# Patient Record
Sex: FEMALE | Race: WHITE | NOT HISPANIC OR LATINO
[De-identification: names, ages, dates, MRNs, and addresses within clinical notes are randomized per-mention and may not be internally consistent; named-entity substitution may affect disease eponyms.]

---

## 2017-09-19 ENCOUNTER — RX RENEWAL (OUTPATIENT)
Age: 52
End: 2017-09-19

## 2017-10-04 ENCOUNTER — RX RENEWAL (OUTPATIENT)
Age: 52
End: 2017-10-04

## 2017-11-25 ENCOUNTER — RX RENEWAL (OUTPATIENT)
Age: 52
End: 2017-11-25

## 2017-12-26 ENCOUNTER — RX RENEWAL (OUTPATIENT)
Age: 52
End: 2017-12-26

## 2018-01-04 ENCOUNTER — RX RENEWAL (OUTPATIENT)
Age: 53
End: 2018-01-04

## 2018-02-21 ENCOUNTER — RX RENEWAL (OUTPATIENT)
Age: 53
End: 2018-02-21

## 2018-02-22 ENCOUNTER — RX RENEWAL (OUTPATIENT)
Age: 53
End: 2018-02-22

## 2018-03-06 ENCOUNTER — APPOINTMENT (OUTPATIENT)
Dept: INTERNAL MEDICINE | Facility: CLINIC | Age: 53
End: 2018-03-06
Payer: COMMERCIAL

## 2018-03-06 ENCOUNTER — NON-APPOINTMENT (OUTPATIENT)
Age: 53
End: 2018-03-06

## 2018-03-06 VITALS
WEIGHT: 220 LBS | OXYGEN SATURATION: 99 % | DIASTOLIC BLOOD PRESSURE: 84 MMHG | SYSTOLIC BLOOD PRESSURE: 150 MMHG | HEART RATE: 108 BPM | HEIGHT: 63 IN | BODY MASS INDEX: 38.98 KG/M2 | TEMPERATURE: 98.9 F

## 2018-03-06 VITALS — DIASTOLIC BLOOD PRESSURE: 86 MMHG | SYSTOLIC BLOOD PRESSURE: 132 MMHG

## 2018-03-06 DIAGNOSIS — Z82.69 FAMILY HISTORY OF OTHER DISEASES OF THE MUSCULOSKELETAL SYSTEM AND CONNECTIVE TISSUE: ICD-10-CM

## 2018-03-06 DIAGNOSIS — Z80.8 FAMILY HISTORY OF MALIGNANT NEOPLASM OF OTHER ORGANS OR SYSTEMS: ICD-10-CM

## 2018-03-06 DIAGNOSIS — Z80.42 FAMILY HISTORY OF MALIGNANT NEOPLASM OF PROSTATE: ICD-10-CM

## 2018-03-06 DIAGNOSIS — Z82.0 FAMILY HISTORY OF EPILEPSY AND OTHER DISEASES OF THE NERVOUS SYSTEM: ICD-10-CM

## 2018-03-06 DIAGNOSIS — Z81.8 FAMILY HISTORY OF OTHER MENTAL AND BEHAVIORAL DISORDERS: ICD-10-CM

## 2018-03-06 DIAGNOSIS — Z78.9 OTHER SPECIFIED HEALTH STATUS: ICD-10-CM

## 2018-03-06 DIAGNOSIS — Z82.3 FAMILY HISTORY OF STROKE: ICD-10-CM

## 2018-03-06 DIAGNOSIS — Z82.49 FAMILY HISTORY OF ISCHEMIC HEART DISEASE AND OTHER DISEASES OF THE CIRCULATORY SYSTEM: ICD-10-CM

## 2018-03-06 LAB
BILIRUB UR QL STRIP: NORMAL
CLARITY UR: CLEAR
COLLECTION METHOD: NORMAL
GLUCOSE UR-MCNC: NORMAL
HCG UR QL: 0.2 EU/DL
HGB UR QL STRIP.AUTO: NORMAL
KETONES UR-MCNC: NORMAL
LEUKOCYTE ESTERASE UR QL STRIP: NORMAL
NITRITE UR QL STRIP: NORMAL
PH UR STRIP: 7
PROT UR STRIP-MCNC: NORMAL
SP GR UR STRIP: 1.01

## 2018-03-06 PROCEDURE — 93000 ELECTROCARDIOGRAM COMPLETE: CPT

## 2018-03-06 PROCEDURE — 99396 PREV VISIT EST AGE 40-64: CPT | Mod: 25

## 2018-03-06 PROCEDURE — 81003 URINALYSIS AUTO W/O SCOPE: CPT | Mod: QW

## 2018-03-23 ENCOUNTER — RX RENEWAL (OUTPATIENT)
Age: 53
End: 2018-03-23

## 2018-03-26 ENCOUNTER — RX RENEWAL (OUTPATIENT)
Age: 53
End: 2018-03-26

## 2018-06-27 ENCOUNTER — RX RENEWAL (OUTPATIENT)
Age: 53
End: 2018-06-27

## 2018-09-21 ENCOUNTER — RX RENEWAL (OUTPATIENT)
Age: 53
End: 2018-09-21

## 2019-01-07 ENCOUNTER — RX RENEWAL (OUTPATIENT)
Age: 54
End: 2019-01-07

## 2019-02-11 ENCOUNTER — RX RENEWAL (OUTPATIENT)
Age: 54
End: 2019-02-11

## 2019-03-13 DIAGNOSIS — Z87.42 PERSONAL HISTORY OF OTHER DISEASES OF THE FEMALE GENITAL TRACT: ICD-10-CM

## 2019-03-22 ENCOUNTER — LABORATORY RESULT (OUTPATIENT)
Age: 54
End: 2019-03-22

## 2019-03-26 ENCOUNTER — NON-APPOINTMENT (OUTPATIENT)
Age: 54
End: 2019-03-26

## 2019-03-26 ENCOUNTER — APPOINTMENT (OUTPATIENT)
Dept: INTERNAL MEDICINE | Facility: CLINIC | Age: 54
End: 2019-03-26
Payer: COMMERCIAL

## 2019-03-26 VITALS
TEMPERATURE: 98 F | OXYGEN SATURATION: 98 % | DIASTOLIC BLOOD PRESSURE: 70 MMHG | WEIGHT: 221 LBS | BODY MASS INDEX: 39.16 KG/M2 | HEART RATE: 110 BPM | HEIGHT: 63 IN | SYSTOLIC BLOOD PRESSURE: 130 MMHG

## 2019-03-26 PROCEDURE — 99396 PREV VISIT EST AGE 40-64: CPT | Mod: 25

## 2019-03-26 PROCEDURE — 93000 ELECTROCARDIOGRAM COMPLETE: CPT

## 2019-03-26 RX ORDER — CHLORDIAZEPOXIDE HYDROCHLORIDE AND CLIDINIUM BROMIDE 5; 2.5 MG/1; MG/1
5-2.5 CAPSULE, GELATIN COATED ORAL
Qty: 30 | Refills: 0 | Status: COMPLETED | COMMUNITY
Start: 2018-03-07 | End: 2019-03-26

## 2019-03-26 RX ORDER — SIMVASTATIN 20 MG/1
20 TABLET, FILM COATED ORAL
Qty: 90 | Refills: 0 | Status: COMPLETED | COMMUNITY
Start: 2017-09-19 | End: 2019-03-26

## 2019-03-26 RX ORDER — BUTALBITAL, ASPIRIN, AND CAFFEINE 325; 50; 40 MG/1; MG/1; MG/1
50-325-40 CAPSULE ORAL
Qty: 30 | Refills: 0 | Status: COMPLETED | COMMUNITY
Start: 2018-03-07 | End: 2019-03-26

## 2019-03-26 NOTE — HISTORY OF PRESENT ILLNESS
[FreeTextEntry1] : The patient is a 53 year year old female who presents for annual physical examination.\par  [de-identified] : The patient is a 53-year-old female with past medical history significant for asthma, hypertension, hyperlipidemia, obesity, iron deficiency anemia presents for routine physical in with numerous complaints. Patient reports that several months ago her pulmonologist found a lesion on her left tonsil which he believes to be a polyp and she was advised to see an otolaryngologist which she has not yet done. She was also told that she has iron deficiency anemia which she also has not yet followed up with. Patient reports that she has had erratic menstrual cycles over the past year. In January and February she had only spotting and has not had significant menstrual bleeding. She has not been taking her iron supplements because she finds them to be difficult to tolerate. The patient also reports that she has been noncompliant with her lipid-lowering medication as she ran out several months ago and never got a renewal. She admits to feeling fatigued which she attributes to her history of anemia. She does not exercise on a regular basis. Patient denies any symptoms of chest pain, palpitations, dizziness, lightheadedness, or syncope. She has had chronic, intermittent asthma for which she sees a pulmonologist on a regular basis. Her appetite remains good and she denies any symptoms of nausea, vomiting, bright red blood per rectum, or melena. She continues to suffer with symptoms of irritable bowel which requires occasional Librax.

## 2019-03-26 NOTE — HEALTH RISK ASSESSMENT
[Good] : ~his/her~  mood as  good [With Family] : lives with family [# of Members in Household ___] :  household currently consist of [unfilled] member(s) [On disability] : on disability [Graduate School] : graduate school [Single] : single [Feels Safe at Home] : Feels safe at home [Fully functional (bathing, dressing, toileting, transferring, walking, feeding)] : Fully functional (bathing, dressing, toileting, transferring, walking, feeding) [Fully functional (using the telephone, shopping, preparing meals, housekeeping, doing laundry, using] : Fully functional and needs no help or supervision to perform IADLs (using the telephone, shopping, preparing meals, housekeeping, doing laundry, using transportation, managing medications and managing finances) [Reports changes in vision] : Reports changes in vision [Smoke Detector] : smoke detector [Carbon Monoxide Detector] : carbon monoxide detector [Seat Belt] :  uses seat belt [Sunscreen] : uses sunscreen [Designated Healthcare Proxy] : Designated healthcare proxy [Name: ___] : Health Care Proxy's Name: [unfilled]  [Relationship: ___] : Relationship: [unfilled] [No falls in past year] : Patient reported no falls in the past year [0] : 2) Feeling down, depressed, or hopeless: Not at all (0) [] : No [Change in mental status noted] : No change in mental status noted [Language] : denies difficulty with language [Behavior] : denies difficulty with behavior [Handling Complex Tasks] : denies difficulty handling complex tasks [Reasoning] : denies difficulty with reasoning [Reports changes in hearing] : Reports no changes in hearing [Reports changes in dental health] : Reports no changes in dental health [MammogramDate] : several years [PapSmearDate] : 2017 [ColonoscopyDate] : 10/16

## 2019-03-26 NOTE — PHYSICAL EXAM
[No Acute Distress] : no acute distress [Well Nourished] : well nourished [Well Developed] : well developed [Well-Appearing] : well-appearing [Normal Sclera/Conjunctiva] : normal sclera/conjunctiva [PERRL] : pupils equal round and reactive to light [EOMI] : extraocular movements intact [Normal Outer Ear/Nose] : the outer ears and nose were normal in appearance [Normal TMs] : both tympanic membranes were normal [No JVD] : no jugular venous distention [Supple] : supple [No Lymphadenopathy] : no lymphadenopathy [Thyroid Normal, No Nodules] : the thyroid was normal and there were no nodules present [No Respiratory Distress] : no respiratory distress  [Clear to Auscultation] : lungs were clear to auscultation bilaterally [No Accessory Muscle Use] : no accessory muscle use [Normal Rate] : normal rate  [Regular Rhythm] : with a regular rhythm [Normal S1, S2] : normal S1 and S2 [No Murmur] : no murmur heard [No Carotid Bruits] : no carotid bruits [No Abdominal Bruit] : a ~M bruit was not heard ~T in the abdomen [No Varicosities] : no varicosities [Pedal Pulses Present] : the pedal pulses are present [No Edema] : there was no peripheral edema [No Extremity Clubbing/Cyanosis] : no extremity clubbing/cyanosis [No Palpable Aorta] : no palpable aorta [Normal Appearance] : normal in appearance [No Nipple Discharge] : no nipple discharge [No Axillary Lymphadenopathy] : no axillary lymphadenopathy [Soft] : abdomen soft [Non Tender] : non-tender [Non-distended] : non-distended [No Masses] : no abdominal mass palpated [No HSM] : no HSM [Normal Bowel Sounds] : normal bowel sounds [Normal Sphincter Tone] : normal sphincter tone [No Mass] : no mass [Normal Posterior Cervical Nodes] : no posterior cervical lymphadenopathy [Normal Anterior Cervical Nodes] : no anterior cervical lymphadenopathy [No Joint Swelling] : no joint swelling [Grossly Normal Strength/Tone] : grossly normal strength/tone [No Rash] : no rash [Normal Gait] : normal gait [Coordination Grossly Intact] : coordination grossly intact [No Focal Deficits] : no focal deficits [Normal Affect] : the affect was normal [Alert and Oriented x3] : oriented to person, place, and time [Normal Insight/Judgement] : insight and judgment were intact [Stool Occult Blood] : stool negative for occult blood [de-identified] : white polypoid lesion on left tonsil [de-identified] : obese

## 2019-03-26 NOTE — DATA REVIEWED
[FreeTextEntry1] : EKG: Normal sinus rhythm at 97 beats per minute, left atrial enlargement, no acute ST-T wave changes.

## 2019-03-27 ENCOUNTER — RX RENEWAL (OUTPATIENT)
Age: 54
End: 2019-03-27

## 2019-04-23 ENCOUNTER — APPOINTMENT (OUTPATIENT)
Dept: GASTROENTEROLOGY | Facility: CLINIC | Age: 54
End: 2019-04-23

## 2019-04-29 ENCOUNTER — RX RENEWAL (OUTPATIENT)
Age: 54
End: 2019-04-29

## 2019-05-09 ENCOUNTER — RX RENEWAL (OUTPATIENT)
Age: 54
End: 2019-05-09

## 2019-06-03 ENCOUNTER — RX RENEWAL (OUTPATIENT)
Age: 54
End: 2019-06-03

## 2019-08-05 ENCOUNTER — RX RENEWAL (OUTPATIENT)
Age: 54
End: 2019-08-05

## 2019-09-04 ENCOUNTER — RX RENEWAL (OUTPATIENT)
Age: 54
End: 2019-09-04

## 2019-09-27 ENCOUNTER — RX RENEWAL (OUTPATIENT)
Age: 54
End: 2019-09-27

## 2019-11-08 ENCOUNTER — RX RENEWAL (OUTPATIENT)
Age: 54
End: 2019-11-08

## 2019-11-18 ENCOUNTER — RX RENEWAL (OUTPATIENT)
Age: 54
End: 2019-11-18

## 2019-12-03 ENCOUNTER — RX RENEWAL (OUTPATIENT)
Age: 54
End: 2019-12-03

## 2019-12-05 ENCOUNTER — RX RENEWAL (OUTPATIENT)
Age: 54
End: 2019-12-05

## 2019-12-23 ENCOUNTER — RX RENEWAL (OUTPATIENT)
Age: 54
End: 2019-12-23

## 2019-12-26 ENCOUNTER — APPOINTMENT (OUTPATIENT)
Dept: INTERNAL MEDICINE | Facility: CLINIC | Age: 54
End: 2019-12-26
Payer: COMMERCIAL

## 2019-12-26 VITALS
WEIGHT: 229 LBS | SYSTOLIC BLOOD PRESSURE: 130 MMHG | TEMPERATURE: 98.5 F | OXYGEN SATURATION: 98 % | BODY MASS INDEX: 40.57 KG/M2 | HEIGHT: 63 IN | DIASTOLIC BLOOD PRESSURE: 72 MMHG | HEART RATE: 104 BPM

## 2019-12-26 PROCEDURE — 99215 OFFICE O/P EST HI 40 MIN: CPT

## 2019-12-26 RX ORDER — DOXYCYCLINE HYCLATE 100 MG/1
100 CAPSULE ORAL
Qty: 14 | Refills: 0 | Status: COMPLETED | COMMUNITY
Start: 2019-12-18

## 2019-12-26 RX ORDER — MEDROXYPROGESTERONE ACETATE 10 MG/1
10 TABLET ORAL
Qty: 10 | Refills: 0 | Status: COMPLETED | COMMUNITY
Start: 2019-11-19

## 2019-12-26 NOTE — HISTORY OF PRESENT ILLNESS
[No Pertinent Cardiac History] : no history of aortic stenosis, atrial fibrillation, coronary artery disease, recent myocardial infarction, or implantable device/pacemaker [Asthma] : asthma [(Patient denies any chest pain, claudication, dyspnea on exertion, orthopnea, palpitations or syncope)] : Patient denies any chest pain, claudication, dyspnea on exertion, orthopnea, palpitations or syncope [COPD] : no COPD [Sleep Apnea] : no sleep apnea [Smoker] : not a smoker [Self] : no previous adverse anesthesia reaction [Family Member] : no family member with adverse anesthesia reaction/sudden death [Chronic Anticoagulation] : no chronic anticoagulation [Chronic Kidney Disease] : no chronic kidney disease [Diabetes] : no diabetes [FreeTextEntry1] : D&C hysteroscopy with possible laparoscopy [FreeTextEntry2] : 12/30/19 [FreeTextEntry3] : Dr. Wheeler [FreeTextEntry4] : The patient is a 54-year-old female with past medical history significant for hypertension, hypothyroidism, hyperlipidemia, migraine headaches, anemia, asthma, presents for preoperative clearance prior to D&C for abnormal vaginal bleeding.  Patient admits that she was recently seen by her allergist whom she sees for management of allergies and asthma along with her pulmonologist.  She was provided medical clearance from her allergist.  Patient was last seen by her allergist on November 11, 2019.  Patient reports that at that time pulmonary function tests were noted to be normal.  She admits to occasional episodes of shortness of breath for which she requires a rescue inhaler.  These episodes are unpredictable and triggered by numerous factors.  She denies recent exertional chest pain, palpitations, fever, chills, nausea, vomiting, or diarrhea.

## 2019-12-26 NOTE — REVIEW OF SYSTEMS
[Negative] : Heme/Lymph [Shortness Of Breath] : shortness of breath [Cough] : cough [Wheezing] : no wheezing

## 2019-12-26 NOTE — PHYSICAL EXAM
[Normal Outer Ear/Nose] : the outer ears and nose were normal in appearance [Normal Oropharynx] : the oropharynx was normal [Normal TMs] : both tympanic membranes were normal [No Edema] : there was no peripheral edema [Normal] : soft, non-tender, non-distended, no masses palpated, no HSM and normal bowel sounds [Normal Posterior Cervical Nodes] : no posterior cervical lymphadenopathy [Normal Anterior Cervical Nodes] : no anterior cervical lymphadenopathy [No Joint Swelling] : no joint swelling [No Rash] : no rash [Coordination Grossly Intact] : coordination grossly intact [Normal Affect] : the affect was normal [No Focal Deficits] : no focal deficits [Alert and Oriented x3] : oriented to person, place, and time [Normal Insight/Judgement] : insight and judgment were intact [de-identified] : Obese

## 2019-12-26 NOTE — ASSESSMENT
[Patient Optimized for Surgery] : Patient optimized for surgery [Other: _____] : [unfilled] [As per surgery] : as per surgery [FreeTextEntry4] : The patient is a 54-year-old female with past medical history significant for asthma, hypertension, hyperlipidemia, hypothyroidism, obesity presents for preoperative clearance prior to D&C.\par \par #1.  Asthma: Patient to obtain pulmonary clearance from Dr. Diomedes Hines.  Pulmonary function tests performed in November noted to be within normal limits.  Pulmonary examination today noted to be within normal limits.  No evidence of active wheezing or bronchospasm.\par \par #2.  Hypertension: Patient was advised to get amlodipine with small sip of water on the morning of surgery.\par \par #3.  Patient advised to avoid aspirin, nonsteroidal anti-inflammatories 1 week prior to surgery.\par \par #4.  DVT prophylaxis as per surgery.

## 2020-01-06 ENCOUNTER — RX RENEWAL (OUTPATIENT)
Age: 55
End: 2020-01-06

## 2020-03-26 ENCOUNTER — APPOINTMENT (OUTPATIENT)
Dept: INTERNAL MEDICINE | Facility: CLINIC | Age: 55
End: 2020-03-26

## 2020-04-06 ENCOUNTER — RX RENEWAL (OUTPATIENT)
Age: 55
End: 2020-04-06

## 2020-04-08 ENCOUNTER — RX RENEWAL (OUTPATIENT)
Age: 55
End: 2020-04-08

## 2020-05-25 ENCOUNTER — RX RENEWAL (OUTPATIENT)
Age: 55
End: 2020-05-25

## 2020-06-17 ENCOUNTER — RX RENEWAL (OUTPATIENT)
Age: 55
End: 2020-06-17

## 2020-06-18 ENCOUNTER — RX RENEWAL (OUTPATIENT)
Age: 55
End: 2020-06-18

## 2020-09-17 ENCOUNTER — LABORATORY RESULT (OUTPATIENT)
Age: 55
End: 2020-09-17

## 2020-10-10 ENCOUNTER — LABORATORY RESULT (OUTPATIENT)
Age: 55
End: 2020-10-10

## 2020-10-12 LAB
25(OH)D3 SERPL-MCNC: 32 NG/ML
ALBUMIN SERPL ELPH-MCNC: 4.5 G/DL
ALP BLD-CCNC: 91 U/L
ALT SERPL-CCNC: 14 U/L
ANION GAP SERPL CALC-SCNC: 13 MMOL/L
APPEARANCE: CLEAR
AST SERPL-CCNC: 12 U/L
BASOPHILS # BLD AUTO: 0.07 K/UL
BASOPHILS NFR BLD AUTO: 0.8 %
BILIRUB SERPL-MCNC: 0.3 MG/DL
BILIRUBIN URINE: NEGATIVE
BLOOD URINE: NEGATIVE
BUN SERPL-MCNC: 12 MG/DL
CALCIUM SERPL-MCNC: 10 MG/DL
CHLORIDE SERPL-SCNC: 99 MMOL/L
CHOLEST SERPL-MCNC: 167 MG/DL
CHOLEST/HDLC SERPL: 3.6 RATIO
CO2 SERPL-SCNC: 28 MMOL/L
COLOR: NORMAL
CREAT SERPL-MCNC: 0.84 MG/DL
EOSINOPHIL # BLD AUTO: 0.22 K/UL
EOSINOPHIL NFR BLD AUTO: 2.5 %
ESTIMATED AVERAGE GLUCOSE: 114 MG/DL
FERRITIN SERPL-MCNC: 13 NG/ML
FOLATE SERPL-MCNC: 14.3 NG/ML
GLUCOSE QUALITATIVE U: NEGATIVE
GLUCOSE SERPL-MCNC: 88 MG/DL
HBA1C MFR BLD HPLC: 5.6 %
HCT VFR BLD CALC: 41.3 %
HDLC SERPL-MCNC: 47 MG/DL
HGB BLD-MCNC: 12.6 G/DL
IMM GRANULOCYTES NFR BLD AUTO: 0.1 %
IRON SATN MFR SERPL: 9 %
IRON SERPL-MCNC: 34 UG/DL
KETONES URINE: NEGATIVE
LDLC SERPL CALC-MCNC: 101 MG/DL
LEUKOCYTE ESTERASE URINE: ABNORMAL
LYMPHOCYTES # BLD AUTO: 2.09 K/UL
LYMPHOCYTES NFR BLD AUTO: 23.4 %
MAN DIFF?: NORMAL
MCHC RBC-ENTMCNC: 28 PG
MCHC RBC-ENTMCNC: 30.5 GM/DL
MCV RBC AUTO: 91.8 FL
MONOCYTES # BLD AUTO: 0.56 K/UL
MONOCYTES NFR BLD AUTO: 6.3 %
NEUTROPHILS # BLD AUTO: 5.98 K/UL
NEUTROPHILS NFR BLD AUTO: 66.9 %
NITRITE URINE: NEGATIVE
PH URINE: 7.5
PLATELET # BLD AUTO: 344 K/UL
POTASSIUM SERPL-SCNC: 4.2 MMOL/L
PROT SERPL-MCNC: 6.7 G/DL
PROTEIN URINE: NEGATIVE
RBC # BLD: 4.5 M/UL
RBC # FLD: 14.6 %
SODIUM SERPL-SCNC: 141 MMOL/L
SPECIFIC GRAVITY URINE: 1.01
TIBC SERPL-MCNC: 380 UG/DL
TRIGL SERPL-MCNC: 99 MG/DL
TSH SERPL-ACNC: 7.23 UIU/ML
UIBC SERPL-MCNC: 346 UG/DL
UROBILINOGEN URINE: NORMAL
VIT B12 SERPL-MCNC: 1395 PG/ML
WBC # FLD AUTO: 8.93 K/UL

## 2020-10-21 ENCOUNTER — APPOINTMENT (OUTPATIENT)
Dept: INTERNAL MEDICINE | Facility: CLINIC | Age: 55
End: 2020-10-21
Payer: COMMERCIAL

## 2020-10-21 ENCOUNTER — NON-APPOINTMENT (OUTPATIENT)
Age: 55
End: 2020-10-21

## 2020-10-21 VITALS
HEIGHT: 63 IN | SYSTOLIC BLOOD PRESSURE: 118 MMHG | RESPIRATION RATE: 17 BRPM | DIASTOLIC BLOOD PRESSURE: 79 MMHG | BODY MASS INDEX: 36.14 KG/M2 | WEIGHT: 204 LBS | OXYGEN SATURATION: 100 % | TEMPERATURE: 97.4 F | HEART RATE: 88 BPM

## 2020-10-21 DIAGNOSIS — R92.8 OTHER ABNORMAL AND INCONCLUSIVE FINDINGS ON DIAGNOSTIC IMAGING OF BREAST: ICD-10-CM

## 2020-10-21 PROCEDURE — 99396 PREV VISIT EST AGE 40-64: CPT | Mod: 25

## 2020-10-21 PROCEDURE — 99072 ADDL SUPL MATRL&STAF TM PHE: CPT

## 2020-10-21 PROCEDURE — 93000 ELECTROCARDIOGRAM COMPLETE: CPT

## 2020-10-22 ENCOUNTER — TRANSCRIPTION ENCOUNTER (OUTPATIENT)
Age: 55
End: 2020-10-22

## 2020-10-22 PROBLEM — R92.8 ABNORMAL MAMMOGRAM OF RIGHT BREAST: Status: ACTIVE | Noted: 2020-10-21

## 2020-10-22 NOTE — HISTORY OF PRESENT ILLNESS
[FreeTextEntry1] : The patient is a 55 year  old female who presents for annual physical examination.\par  [de-identified] : Patient is a 55-year-old female with past medical history significant for asthma, hypertension, hyperlipidemia, hypothyroidism, GERD, migraine headaches, presents for routine physical exam.  \par Patient continues to see her pulmonologist regularly and Allergist for allergy shots.  She reports that her asthma symptoms have been fairly well controlled.\par Prior to COVID-19 pandemic she has been scheduled for a hysterectomy due to history of atypical endometrial hyperplasia.  She admits that she has not yet scheduled to have the surgery and will be contacting her surgeon in the near future.\par She also admits that she had been recommended to repeat right breast mammo and targeted ultrasound after abnormality was noted on mammogram from January, 2020.  She has not yet done this test either.  She states she that appointments were initially canceled due to COVID-19 pandemic and currently she is caring for her mother who recently fractured her wrist.\par She has otherwise been feeling well.\par

## 2020-10-22 NOTE — PHYSICAL EXAM
[No Acute Distress] : no acute distress [Well Nourished] : well nourished [Well Developed] : well developed [Well-Appearing] : well-appearing [Normal Sclera/Conjunctiva] : normal sclera/conjunctiva [PERRL] : pupils equal round and reactive to light [EOMI] : extraocular movements intact [Normal Outer Ear/Nose] : the outer ears and nose were normal in appearance [Normal Oropharynx] : the oropharynx was normal [No JVD] : no jugular venous distention [No Lymphadenopathy] : no lymphadenopathy [Supple] : supple [Thyroid Normal, No Nodules] : the thyroid was normal and there were no nodules present [No Respiratory Distress] : no respiratory distress  [No Accessory Muscle Use] : no accessory muscle use [Clear to Auscultation] : lungs were clear to auscultation bilaterally [Normal Rate] : normal rate  [Regular Rhythm] : with a regular rhythm [Normal S1, S2] : normal S1 and S2 [No Murmur] : no murmur heard [No Carotid Bruits] : no carotid bruits [No Varicosities] : no varicosities [Pedal Pulses Present] : the pedal pulses are present [No Edema] : there was no peripheral edema [No Extremity Clubbing/Cyanosis] : no extremity clubbing/cyanosis [Normal Appearance] : normal in appearance [No Nipple Discharge] : no nipple discharge [No Axillary Lymphadenopathy] : no axillary lymphadenopathy [Soft] : abdomen soft [Non Tender] : non-tender [Non-distended] : non-distended [No Masses] : no abdominal mass palpated [No HSM] : no HSM [Normal Bowel Sounds] : normal bowel sounds [Normal Posterior Cervical Nodes] : no posterior cervical lymphadenopathy [Normal Anterior Cervical Nodes] : no anterior cervical lymphadenopathy [No Joint Swelling] : no joint swelling [Grossly Normal Strength/Tone] : grossly normal strength/tone [No Rash] : no rash [Coordination Grossly Intact] : coordination grossly intact [No Focal Deficits] : no focal deficits [Normal Gait] : normal gait [Normal Affect] : the affect was normal [Alert and Oriented x3] : oriented to person, place, and time [Normal Insight/Judgement] : insight and judgment were intact [de-identified] : small white tonsillar cyst on left tonsit

## 2020-10-22 NOTE — HEALTH RISK ASSESSMENT
[Good] : ~his/her~  mood as  good [No] : No [No falls in past year] : Patient reported no falls in the past year [0] : 2) Feeling down, depressed, or hopeless: Not at all (0) [With Family] : lives with family [Unemployed] : unemployed [College] : College [Single] : single [Feels Safe at Home] : Feels safe at home [Fully functional (bathing, dressing, toileting, transferring, walking, feeding)] : Fully functional (bathing, dressing, toileting, transferring, walking, feeding) [Fully functional (using the telephone, shopping, preparing meals, housekeeping, doing laundry, using] : Fully functional and needs no help or supervision to perform IADLs (using the telephone, shopping, preparing meals, housekeeping, doing laundry, using transportation, managing medications and managing finances) [Smoke Detector] : smoke detector [Carbon Monoxide Detector] : carbon monoxide detector [Seat Belt] :  uses seat belt [Sunscreen] : uses sunscreen [] : No [de-identified] : does not exercise [Change in mental status noted] : No change in mental status noted [Language] : denies difficulty with language [Behavior] : denies difficulty with behavior [Handling Complex Tasks] : denies difficulty handling complex tasks [Reasoning] : denies difficulty with reasoning [Reports changes in hearing] : Reports no changes in hearing [Reports changes in vision] : Reports no changes in vision [Reports changes in dental health] : Reports no changes in dental health [MammogramDate] : 01/20 [ColonoscopyDate] : 10/16 [ColonoscopyComments] : tubular adenoma x2, Repeat recommended 10/19

## 2021-01-11 ENCOUNTER — RX RENEWAL (OUTPATIENT)
Age: 56
End: 2021-01-11

## 2021-02-04 ENCOUNTER — RX RENEWAL (OUTPATIENT)
Age: 56
End: 2021-02-04

## 2021-03-17 ENCOUNTER — APPOINTMENT (OUTPATIENT)
Dept: INTERNAL MEDICINE | Facility: CLINIC | Age: 56
End: 2021-03-17

## 2021-03-18 ENCOUNTER — APPOINTMENT (OUTPATIENT)
Dept: INTERNAL MEDICINE | Facility: CLINIC | Age: 56
End: 2021-03-18
Payer: COMMERCIAL

## 2021-03-18 VITALS
DIASTOLIC BLOOD PRESSURE: 82 MMHG | SYSTOLIC BLOOD PRESSURE: 129 MMHG | HEART RATE: 92 BPM | RESPIRATION RATE: 17 BRPM | OXYGEN SATURATION: 99 % | BODY MASS INDEX: 37.21 KG/M2 | HEIGHT: 63 IN | TEMPERATURE: 97.8 F | WEIGHT: 210 LBS

## 2021-03-18 DIAGNOSIS — M72.2 PLANTAR FASCIAL FIBROMATOSIS: ICD-10-CM

## 2021-03-18 DIAGNOSIS — R79.89 OTHER SPECIFIED ABNORMAL FINDINGS OF BLOOD CHEMISTRY: ICD-10-CM

## 2021-03-18 DIAGNOSIS — N93.9 ABNORMAL UTERINE AND VAGINAL BLEEDING, UNSPECIFIED: ICD-10-CM

## 2021-03-18 DIAGNOSIS — Z01.818 ENCOUNTER FOR OTHER PREPROCEDURAL EXAMINATION: ICD-10-CM

## 2021-03-18 DIAGNOSIS — Z86.69 PERSONAL HISTORY OF OTHER DISEASES OF THE NERVOUS SYSTEM AND SENSE ORGANS: ICD-10-CM

## 2021-03-18 DIAGNOSIS — Z87.898 PERSONAL HISTORY OF OTHER SPECIFIED CONDITIONS: ICD-10-CM

## 2021-03-18 DIAGNOSIS — E53.8 DEFICIENCY OF OTHER SPECIFIED B GROUP VITAMINS: ICD-10-CM

## 2021-03-18 DIAGNOSIS — Z86.2 PERSONAL HISTORY OF DISEASES OF THE BLOOD AND BLOOD-FORMING ORGANS AND CERTAIN DISORDERS INVOLVING THE IMMUNE MECHANISM: ICD-10-CM

## 2021-03-18 DIAGNOSIS — J35.8 OTHER CHRONIC DISEASES OF TONSILS AND ADENOIDS: ICD-10-CM

## 2021-03-18 DIAGNOSIS — G43.909 MIGRAINE, UNSPECIFIED, NOT INTRACTABLE, W/OUT STATUS MIGRAINOSUS: ICD-10-CM

## 2021-03-18 PROCEDURE — 99072 ADDL SUPL MATRL&STAF TM PHE: CPT

## 2021-03-18 PROCEDURE — 99214 OFFICE O/P EST MOD 30 MIN: CPT

## 2021-03-18 RX ORDER — LEVOTHYROXINE SODIUM 0.03 MG/1
25 TABLET ORAL
Qty: 90 | Refills: 3 | Status: DISCONTINUED | COMMUNITY
Start: 2019-03-26 | End: 2021-03-18

## 2021-03-18 RX ORDER — FLUTICASONE PROPIONATE 50 UG/1
50 SPRAY, METERED NASAL
Refills: 0 | Status: DISCONTINUED | COMMUNITY
Start: 2018-03-06 | End: 2021-03-18

## 2021-03-18 RX ORDER — ALBUTEROL SULFATE 90 UG/1
108 (90 BASE) INHALANT RESPIRATORY (INHALATION)
Qty: 1 | Refills: 2 | Status: ACTIVE | COMMUNITY
Start: 2021-03-18

## 2021-03-18 RX ORDER — UMECLIDINIUM 62.5 UG/1
62.5 AEROSOL, POWDER ORAL
Refills: 0 | Status: ACTIVE | COMMUNITY
Start: 2018-03-06

## 2021-03-18 RX ORDER — BENZONATATE 200 MG/1
200 CAPSULE ORAL
Qty: 21 | Refills: 0 | Status: DISCONTINUED | COMMUNITY
Start: 2019-12-18 | End: 2021-03-18

## 2021-03-18 RX ORDER — FLUTICASONE PROPIONATE AND SALMETEROL 50; 500 UG/1; UG/1
500-50 POWDER RESPIRATORY (INHALATION) TWICE DAILY
Refills: 0 | Status: DISCONTINUED | COMMUNITY
Start: 2018-03-06 | End: 2021-03-18

## 2021-03-18 RX ORDER — MUPIROCIN 20 MG/G
2 OINTMENT TOPICAL
Qty: 22 | Refills: 0 | Status: DISCONTINUED | COMMUNITY
Start: 2019-10-18 | End: 2021-03-18

## 2021-03-18 RX ORDER — CETIRIZINE HYDROCHLORIDE 10 MG/1
10 CAPSULE, LIQUID FILLED ORAL
Refills: 0 | Status: DISCONTINUED | COMMUNITY
Start: 2018-03-06 | End: 2021-03-18

## 2021-03-18 RX ORDER — BUTALBITAL, ACETAMINOPHEN AND CAFFEINE 325; 50; 40 MG/1; MG/1; MG/1
50-325-40 TABLET ORAL
Qty: 30 | Refills: 0 | Status: DISCONTINUED | COMMUNITY
Start: 2019-03-26 | End: 2021-03-18

## 2021-03-18 RX ORDER — MONTELUKAST SODIUM 10 MG/1
10 TABLET, FILM COATED ORAL
Qty: 90 | Refills: 0 | Status: ACTIVE | COMMUNITY
Start: 2018-03-06

## 2021-03-18 RX ORDER — NYSTATIN 100000 [USP'U]/G
100000 CREAM TOPICAL 3 TIMES DAILY
Qty: 30 | Refills: 0 | Status: DISCONTINUED | COMMUNITY
Start: 2017-05-22 | End: 2021-03-18

## 2021-03-18 RX ORDER — DEXLANSOPRAZOLE 60 MG/1
60 CAPSULE, DELAYED RELEASE ORAL
Qty: 90 | Refills: 0 | Status: DISCONTINUED | COMMUNITY
Start: 2017-11-25 | End: 2021-03-18

## 2021-05-04 ENCOUNTER — RX RENEWAL (OUTPATIENT)
Age: 56
End: 2021-05-04

## 2021-05-15 ENCOUNTER — RX RENEWAL (OUTPATIENT)
Age: 56
End: 2021-05-15

## 2021-06-14 ENCOUNTER — RX RENEWAL (OUTPATIENT)
Age: 56
End: 2021-06-14

## 2021-06-21 ENCOUNTER — RX RENEWAL (OUTPATIENT)
Age: 56
End: 2021-06-21

## 2021-06-22 DIAGNOSIS — M79.673 PAIN IN UNSPECIFIED FOOT: ICD-10-CM

## 2021-08-23 ENCOUNTER — RX RENEWAL (OUTPATIENT)
Age: 56
End: 2021-08-23

## 2021-09-20 ENCOUNTER — RX RENEWAL (OUTPATIENT)
Age: 56
End: 2021-09-20

## 2021-11-03 ENCOUNTER — RX RENEWAL (OUTPATIENT)
Age: 56
End: 2021-11-03

## 2021-12-06 ENCOUNTER — RX RENEWAL (OUTPATIENT)
Age: 56
End: 2021-12-06

## 2021-12-07 ENCOUNTER — LABORATORY RESULT (OUTPATIENT)
Age: 56
End: 2021-12-07

## 2021-12-14 ENCOUNTER — LABORATORY RESULT (OUTPATIENT)
Age: 56
End: 2021-12-14

## 2021-12-14 LAB
25(OH)D3 SERPL-MCNC: 36.9 NG/ML
ALBUMIN SERPL ELPH-MCNC: 4.7 G/DL
ALP BLD-CCNC: 104 U/L
ALT SERPL-CCNC: 25 U/L
ANION GAP SERPL CALC-SCNC: 15 MMOL/L
AST SERPL-CCNC: 22 U/L
BASOPHILS # BLD AUTO: 0.08 K/UL
BASOPHILS NFR BLD AUTO: 1 %
BILIRUB SERPL-MCNC: 0.4 MG/DL
BUN SERPL-MCNC: 10 MG/DL
CALCIUM SERPL-MCNC: 10.1 MG/DL
CHLORIDE SERPL-SCNC: 98 MMOL/L
CHOLEST SERPL-MCNC: 182 MG/DL
CO2 SERPL-SCNC: 26 MMOL/L
CREAT SERPL-MCNC: 0.74 MG/DL
EOSINOPHIL # BLD AUTO: 0.26 K/UL
EOSINOPHIL NFR BLD AUTO: 3.1 %
FERRITIN SERPL-MCNC: 13 NG/ML
FOLATE SERPL-MCNC: 6.1 NG/ML
GLUCOSE SERPL-MCNC: 99 MG/DL
HCT VFR BLD CALC: 40.2 %
HDLC SERPL-MCNC: 56 MG/DL
HGB BLD-MCNC: 12.2 G/DL
IMM GRANULOCYTES NFR BLD AUTO: 0.4 %
IRON SATN MFR SERPL: 10 %
IRON SERPL-MCNC: 42 UG/DL
LDLC SERPL CALC-MCNC: 105 MG/DL
LYMPHOCYTES # BLD AUTO: 1.68 K/UL
LYMPHOCYTES NFR BLD AUTO: 20.2 %
MAN DIFF?: NORMAL
MCHC RBC-ENTMCNC: 26.7 PG
MCHC RBC-ENTMCNC: 30.3 GM/DL
MCV RBC AUTO: 88 FL
MONOCYTES # BLD AUTO: 0.61 K/UL
MONOCYTES NFR BLD AUTO: 7.3 %
NEUTROPHILS # BLD AUTO: 5.65 K/UL
NEUTROPHILS NFR BLD AUTO: 68 %
NONHDLC SERPL-MCNC: 126 MG/DL
PLATELET # BLD AUTO: 368 K/UL
POTASSIUM SERPL-SCNC: 4 MMOL/L
PROT SERPL-MCNC: 7.1 G/DL
RBC # BLD: 4.57 M/UL
RBC # FLD: 14.8 %
SODIUM SERPL-SCNC: 139 MMOL/L
TIBC SERPL-MCNC: 433 UG/DL
TRIGL SERPL-MCNC: 106 MG/DL
TSH SERPL-ACNC: 3.39 UIU/ML
UIBC SERPL-MCNC: 391 UG/DL
VIT B12 SERPL-MCNC: 790 PG/ML
WBC # FLD AUTO: 8.31 K/UL

## 2021-12-15 LAB
APPEARANCE: CLEAR
BILIRUBIN URINE: NEGATIVE
BLOOD URINE: NEGATIVE
COLOR: NORMAL
ESTIMATED AVERAGE GLUCOSE: 111 MG/DL
GLUCOSE QUALITATIVE U: NEGATIVE
HBA1C MFR BLD HPLC: 5.5 %
KETONES URINE: NEGATIVE
LEUKOCYTE ESTERASE URINE: ABNORMAL
NITRITE URINE: NEGATIVE
PH URINE: 7
PROTEIN URINE: NEGATIVE
SPECIFIC GRAVITY URINE: 1.01
UROBILINOGEN URINE: NORMAL

## 2021-12-30 ENCOUNTER — RX RENEWAL (OUTPATIENT)
Age: 56
End: 2021-12-30

## 2022-02-02 ENCOUNTER — RX RENEWAL (OUTPATIENT)
Age: 57
End: 2022-02-02

## 2022-02-23 NOTE — DATA REVIEWED
[FreeTextEntry1] : EKG: Normal sinus rhythm at 78 bpm, within normal limits. Posterior Auricular Interpolation Flap Text: A decision was made to reconstruct the defect utilizing an interpolation axial flap and a staged reconstruction.  A telfa template was made of the defect.  This telfa template was then used to outline the posterior auricular interpolation flap.  The donor area for the pedicle flap was then injected with anesthesia.  The flap was excised through the skin and subcutaneous tissue down to the layer of the underlying musculature.  The pedicle flap was carefully excised within this deep plane to maintain its blood supply.  The edges of the donor site were undermined.   The donor site was closed in a primary fashion.  The pedicle was then rotated into position and sutured.  Once the tube was sutured into place, adequate blood supply was confirmed with blanching and refill.  The pedicle was then wrapped with xeroform gauze and dressed appropriately with a telfa and gauze bandage to ensure continued blood supply and protect the attached pedicle.

## 2022-02-28 ENCOUNTER — RX RENEWAL (OUTPATIENT)
Age: 57
End: 2022-02-28

## 2022-03-02 ENCOUNTER — NON-APPOINTMENT (OUTPATIENT)
Age: 57
End: 2022-03-02

## 2022-03-02 ENCOUNTER — APPOINTMENT (OUTPATIENT)
Dept: INTERNAL MEDICINE | Facility: CLINIC | Age: 57
End: 2022-03-02
Payer: COMMERCIAL

## 2022-03-02 ENCOUNTER — LABORATORY RESULT (OUTPATIENT)
Age: 57
End: 2022-03-02

## 2022-03-02 VITALS
OXYGEN SATURATION: 100 % | RESPIRATION RATE: 17 BRPM | DIASTOLIC BLOOD PRESSURE: 81 MMHG | TEMPERATURE: 97.8 F | WEIGHT: 212 LBS | BODY MASS INDEX: 37.56 KG/M2 | HEIGHT: 63 IN | HEART RATE: 87 BPM | SYSTOLIC BLOOD PRESSURE: 124 MMHG

## 2022-03-02 DIAGNOSIS — R30.0 DYSURIA: ICD-10-CM

## 2022-03-02 DIAGNOSIS — D12.6 BENIGN NEOPLASM OF COLON, UNSPECIFIED: ICD-10-CM

## 2022-03-02 DIAGNOSIS — R92.2 INCONCLUSIVE MAMMOGRAM: ICD-10-CM

## 2022-03-02 PROCEDURE — 99396 PREV VISIT EST AGE 40-64: CPT | Mod: 25

## 2022-03-02 PROCEDURE — 93000 ELECTROCARDIOGRAM COMPLETE: CPT

## 2022-03-02 RX ORDER — FLUTICASONE PROPIONATE 50 UG/1
50 SPRAY, METERED NASAL
Qty: 1 | Refills: 1 | Status: ACTIVE | COMMUNITY
Start: 2022-03-02

## 2022-03-02 RX ORDER — FOLIC ACID 1 MG/1
1 TABLET ORAL DAILY
Qty: 90 | Refills: 0 | Status: COMPLETED | COMMUNITY
Start: 2019-03-26 | End: 2022-03-02

## 2022-03-02 NOTE — PHYSICAL EXAM
[Normal TMs] : both tympanic membranes were normal [No Carotid Bruits] : no carotid bruits [No Edema] : there was no peripheral edema [Normal Appearance] : normal in appearance [No Masses] : no palpable masses [No Nipple Discharge] : no nipple discharge [No Axillary Lymphadenopathy] : no axillary lymphadenopathy [Normal] : soft, non-tender, non-distended, no masses palpated, no HSM and normal bowel sounds [Normal Sphincter Tone] : normal sphincter tone [No Mass] : no mass [Normal Posterior Cervical Nodes] : no posterior cervical lymphadenopathy [Normal Anterior Cervical Nodes] : no anterior cervical lymphadenopathy [Grossly Normal Strength/Tone] : grossly normal strength/tone [No Rash] : no rash [No Focal Deficits] : no focal deficits [Normal Affect] : the affect was normal [Alert and Oriented x3] : oriented to person, place, and time [Normal Insight/Judgement] : insight and judgment were intact [Stool Occult Blood] : stool negative for occult blood

## 2022-03-02 NOTE — HEALTH RISK ASSESSMENT
[Good] : ~his/her~  mood as  good [Never] : Never [Yes] : Yes [No falls in past year] : Patient reported no falls in the past year [0] : 2) Feeling down, depressed, or hopeless: Not at all (0) [With Family] : lives with family [# of Members in Household ___] :  household currently consist of [unfilled] member(s) [Single] : single [Feels Safe at Home] : Feels safe at home [Fully functional (bathing, dressing, toileting, transferring, walking, feeding)] : Fully functional (bathing, dressing, toileting, transferring, walking, feeding) [Fully functional (using the telephone, shopping, preparing meals, housekeeping, doing laundry, using] : Fully functional and needs no help or supervision to perform IADLs (using the telephone, shopping, preparing meals, housekeeping, doing laundry, using transportation, managing medications and managing finances) [Smoke Detector] : smoke detector [Carbon Monoxide Detector] : carbon monoxide detector [Seat Belt] :  uses seat belt [Sunscreen] : uses sunscreen [Designated Healthcare Proxy] : Designated healthcare proxy [Name: ___] : Health Care Proxy's Name: [unfilled]  [Relationship: ___] : Relationship: [unfilled] [de-identified] : socially [de-identified] : minimal [Change in mental status noted] : No change in mental status noted [Language] : denies difficulty with language [Behavior] : denies difficulty with behavior [Handling Complex Tasks] : denies difficulty handling complex tasks [Reasoning] : denies difficulty with reasoning [Reports changes in hearing] : Reports no changes in hearing [Reports changes in vision] : Reports no changes in vision [Reports changes in dental health] : Reports no changes in dental health [MammogramDate] : 11/2020 [PapSmearDate] : 01/2022 [BoneDensityDate] : 11/2020 [ColonoscopyDate] : 10/2016

## 2022-03-02 NOTE — HISTORY OF PRESENT ILLNESS
[FreeTextEntry1] : The patient is a 56 year  old female who presents for annual physical examination.\par  [de-identified] : Patient is a 56-year-old female with past medical history significant for hypertension, hyperlipidemia, hypothyroidism, asthma, uterine cancer, obesity, presents for annual wellness exam.  \par Patient underwent RONDA/ BSO in March 2021 for endometrial adenocarcinoma (grade 1) \par Up-to-date with follow up with GYN- Oncology Dr. Radha Stanley.\par GYN- Dr. Wheeler in January 2022.  Pap smear reportedly negative.\par COntinues to follow up with Pulmonologist - Diomedes Durbin for management of asthma.\par Under care on Allergy Immunologist - Dr. Larry Lin.\par Has had COVID 19 vaccinations along with booster.\par Received flu vaccine 10/2021.\par Overdue for mammogram, breast ultrasound, and colonoscopy.\par Continues to struggle to lose weight.  Admits she does not exercise regularly.\par Reports she had a urinary tract infection early December treated with antibiotics at urgent care.  Symptoms resolved however, she is now complaining of mild dysuria.  Denies fever, chills, flank pain, nausea, vomiting.\par

## 2022-03-04 LAB
APPEARANCE: CLEAR
BILIRUBIN URINE: NEGATIVE
BLOOD URINE: NEGATIVE
COLOR: NORMAL
GLUCOSE QUALITATIVE U: NEGATIVE
KETONES URINE: NEGATIVE
LEUKOCYTE ESTERASE URINE: NEGATIVE
NITRITE URINE: NEGATIVE
PH URINE: 7
PROTEIN URINE: NEGATIVE
SPECIFIC GRAVITY URINE: 1.01
UROBILINOGEN URINE: NORMAL

## 2022-03-28 ENCOUNTER — RX RENEWAL (OUTPATIENT)
Age: 57
End: 2022-03-28

## 2022-04-29 ENCOUNTER — RX RENEWAL (OUTPATIENT)
Age: 57
End: 2022-04-29

## 2022-05-27 ENCOUNTER — RX RENEWAL (OUTPATIENT)
Age: 57
End: 2022-05-27

## 2022-06-23 ENCOUNTER — RX RENEWAL (OUTPATIENT)
Age: 57
End: 2022-06-23

## 2022-06-24 DIAGNOSIS — K21.9 GASTRO-ESOPHAGEAL REFLUX DISEASE W/OUT ESOPHAGITIS: ICD-10-CM

## 2022-08-25 ENCOUNTER — RX RENEWAL (OUTPATIENT)
Age: 57
End: 2022-08-25

## 2022-09-23 ENCOUNTER — RX RENEWAL (OUTPATIENT)
Age: 57
End: 2022-09-23

## 2022-11-21 ENCOUNTER — RX RENEWAL (OUTPATIENT)
Age: 57
End: 2022-11-21

## 2022-12-19 ENCOUNTER — RX RENEWAL (OUTPATIENT)
Age: 57
End: 2022-12-19

## 2023-01-03 ENCOUNTER — RX RENEWAL (OUTPATIENT)
Age: 58
End: 2023-01-03

## 2023-01-17 ENCOUNTER — RX RENEWAL (OUTPATIENT)
Age: 58
End: 2023-01-17

## 2023-03-20 ENCOUNTER — RX RENEWAL (OUTPATIENT)
Age: 58
End: 2023-03-20

## 2023-04-17 ENCOUNTER — RX RENEWAL (OUTPATIENT)
Age: 58
End: 2023-04-17

## 2023-04-20 ENCOUNTER — RX RENEWAL (OUTPATIENT)
Age: 58
End: 2023-04-20

## 2023-05-03 ENCOUNTER — APPOINTMENT (OUTPATIENT)
Dept: INTERNAL MEDICINE | Facility: CLINIC | Age: 58
End: 2023-05-03
Payer: COMMERCIAL

## 2023-05-03 ENCOUNTER — LABORATORY RESULT (OUTPATIENT)
Age: 58
End: 2023-05-03

## 2023-05-03 ENCOUNTER — NON-APPOINTMENT (OUTPATIENT)
Age: 58
End: 2023-05-03

## 2023-05-03 VITALS
HEIGHT: 63 IN | TEMPERATURE: 97.7 F | HEART RATE: 90 BPM | OXYGEN SATURATION: 98 % | SYSTOLIC BLOOD PRESSURE: 133 MMHG | BODY MASS INDEX: 38.98 KG/M2 | WEIGHT: 220 LBS | DIASTOLIC BLOOD PRESSURE: 84 MMHG

## 2023-05-03 DIAGNOSIS — C55 MALIGNANT NEOPLASM OF UTERUS, PART UNSPECIFIED: ICD-10-CM

## 2023-05-03 DIAGNOSIS — E66.9 OBESITY, UNSPECIFIED: ICD-10-CM

## 2023-05-03 DIAGNOSIS — J45.909 UNSPECIFIED ASTHMA, UNCOMPLICATED: ICD-10-CM

## 2023-05-03 DIAGNOSIS — K58.9 IRRITABLE BOWEL SYNDROME W/OUT DIARRHEA: ICD-10-CM

## 2023-05-03 DIAGNOSIS — E03.9 HYPOTHYROIDISM, UNSPECIFIED: ICD-10-CM

## 2023-05-03 DIAGNOSIS — I10 ESSENTIAL (PRIMARY) HYPERTENSION: ICD-10-CM

## 2023-05-03 DIAGNOSIS — E78.5 HYPERLIPIDEMIA, UNSPECIFIED: ICD-10-CM

## 2023-05-03 PROCEDURE — 93000 ELECTROCARDIOGRAM COMPLETE: CPT

## 2023-05-03 PROCEDURE — 99396 PREV VISIT EST AGE 40-64: CPT | Mod: 25

## 2023-05-03 RX ORDER — FLUTICASONE PROPIONATE AND SALMETEROL 500; 50 UG/1; UG/1
500-50 POWDER RESPIRATORY (INHALATION) TWICE DAILY
Refills: 0 | Status: ACTIVE | COMMUNITY
Start: 2023-05-03

## 2023-05-03 RX ORDER — BUTALBITAL, ASPIRIN, AND CAFFEINE 325; 50; 40 MG/1; MG/1; MG/1
50-325-40 CAPSULE ORAL
Qty: 15 | Refills: 0 | Status: ACTIVE | COMMUNITY
Start: 2019-03-28 | End: 1900-01-01

## 2023-05-03 RX ORDER — CHLORDIAZEPOXIDE HYDROCHLORIDE AND CLIDINIUM BROMIDE 5; 2.5 MG/1; MG/1
5-2.5 CAPSULE, GELATIN COATED ORAL
Qty: 15 | Refills: 0 | Status: ACTIVE | COMMUNITY
Start: 2019-03-26 | End: 1900-01-01

## 2023-05-03 RX ORDER — DEXLANSOPRAZOLE 60 MG/1
60 CAPSULE, DELAYED RELEASE ORAL
Qty: 30 | Refills: 0 | Status: DISCONTINUED | COMMUNITY
Start: 2018-03-07 | End: 2023-05-03

## 2023-05-03 RX ORDER — DEXLANSOPRAZOLE 60 MG/1
60 CAPSULE, DELAYED RELEASE ORAL DAILY
Qty: 90 | Refills: 1 | Status: DISCONTINUED | COMMUNITY
Start: 2022-03-28 | End: 2023-05-03

## 2023-05-03 RX ORDER — FLUTICASONE PROPIONATE AND SALMETEROL 50; 500 UG/1; UG/1
500-50 POWDER RESPIRATORY (INHALATION)
Qty: 1 | Refills: 2 | Status: DISCONTINUED | COMMUNITY
Start: 2021-03-18 | End: 2023-05-03

## 2023-05-03 NOTE — ASSESSMENT
[FreeTextEntry1] : Healthcare maintenance: Patient due for mammography and breast ultrasound in June.  Prescriptions provided.\par Patient overdue for colonoscopy.  Referral provided and strongly urged to schedule an appointment.\par Up-to-date with GYN exam.\par Age-appropriate preventive care counseling and Healthcare maintenance discussed including but not limited to proper diet, exercise, routine dental care, skin cancer screening, and eye care.\par

## 2023-05-03 NOTE — HEALTH RISK ASSESSMENT
Please call patient, does he have potassium tablets at home to take with his lasix? If not I will call in since he will likely be taking this regularly.    [Good] : ~his/her~  mood as  good [Yes] : Yes [No falls in past year] : Patient reported no falls in the past year [0] : 2) Feeling down, depressed, or hopeless: Not at all (0) [Patient reported bone density results were normal] : Patient reported bone density results were normal [With Family] : lives with family [# of Members in Household ___] :  household currently consist of [unfilled] member(s) [Unemployed] : unemployed [Graduate School] : graduate school [Single] : single [Feels Safe at Home] : Feels safe at home [Fully functional (bathing, dressing, toileting, transferring, walking, feeding)] : Fully functional (bathing, dressing, toileting, transferring, walking, feeding) [Fully functional (using the telephone, shopping, preparing meals, housekeeping, doing laundry, using] : Fully functional and needs no help or supervision to perform IADLs (using the telephone, shopping, preparing meals, housekeeping, doing laundry, using transportation, managing medications and managing finances) [Smoke Detector] : smoke detector [Carbon Monoxide Detector] : carbon monoxide detector [Seat Belt] :  uses seat belt [Sunscreen] : uses sunscreen [Designated Healthcare Proxy] : Designated healthcare proxy [Name: ___] : Health Care Proxy's Name: [unfilled]  [Relationship: ___] : Relationship: [unfilled] [Never] : Never [de-identified] : 1-2 drinks of whiskey several times per week [de-identified] : does not exercise regularly [[id number]] : 0 [Change in mental status noted] : No change in mental status noted [Language] : denies difficulty with language [Behavior] : denies difficulty with behavior [Handling Complex Tasks] : denies difficulty handling complex tasks [Reasoning] : denies difficulty with reasoning [Reports changes in hearing] : Reports no changes in hearing [Reports changes in vision] : Reports no changes in vision [Reports changes in dental health] : Reports no changes in dental health [MammogramDate] : 06/2022 [PapSmearDate] : 03/2023 [PapSmearComments] : Dr. Wheeler [BoneDensityDate] : 03/2023 [BoneDensityComments] : Dr. Wheeler [ColonoscopyDate] : 10/2016 [ColonoscopyComments] : Dr. Siddharth Andres

## 2023-05-03 NOTE — HISTORY OF PRESENT ILLNESS
[de-identified] : Patient is a 57-year-old female with h/o uterine cancer status post RONDA/BSO, HTN, HLD, asthma, obesity presents for annual wellness exam.\par Patient reports that she was seen by her GYN Dr. Wheeler in March for routine exam.\par Scheduled for oncology follow up in  with Dr. Becker.\par Due for Mammogram and breast US in 2023.  Prescriptions were given to her by her gynecologist.\par Remains under the care of of her pulmonologist - Dr. Roldan, for management of asthma.\par Admits to h/o IBS . Has been more constipated in recent months.  Takes Librax very infrequently requests a prescription as the old one is now .\par Overdue for colonoscopy.\par Offers no additional complaints.\par Admits that she has not been exercising and struggles to lose weight.

## 2023-05-03 NOTE — PHYSICAL EXAM
[No Acute Distress] : no acute distress [Well Nourished] : well nourished [Well Developed] : well developed [Well-Appearing] : well-appearing [Normal Sclera/Conjunctiva] : normal sclera/conjunctiva [PERRL] : pupils equal round and reactive to light [EOMI] : extraocular movements intact [Normal Outer Ear/Nose] : the outer ears and nose were normal in appearance [Normal Oropharynx] : the oropharynx was normal [No JVD] : no jugular venous distention [No Lymphadenopathy] : no lymphadenopathy [Supple] : supple [Thyroid Normal, No Nodules] : the thyroid was normal and there were no nodules present [No Respiratory Distress] : no respiratory distress  [No Accessory Muscle Use] : no accessory muscle use [Clear to Auscultation] : lungs were clear to auscultation bilaterally [Normal Rate] : normal rate  [Regular Rhythm] : with a regular rhythm [Normal S1, S2] : normal S1 and S2 [No Murmur] : no murmur heard [No Carotid Bruits] : no carotid bruits [No Varicosities] : no varicosities [Pedal Pulses Present] : the pedal pulses are present [No Edema] : there was no peripheral edema [No Extremity Clubbing/Cyanosis] : no extremity clubbing/cyanosis [Normal Appearance] : normal in appearance [No Nipple Discharge] : no nipple discharge [No Axillary Lymphadenopathy] : no axillary lymphadenopathy [Soft] : abdomen soft [Non Tender] : non-tender [Non-distended] : non-distended [No Masses] : no abdominal mass palpated [No HSM] : no HSM [Normal Bowel Sounds] : normal bowel sounds [Normal Sphincter Tone] : normal sphincter tone [No Mass] : no mass [Normal Posterior Cervical Nodes] : no posterior cervical lymphadenopathy [Normal Anterior Cervical Nodes] : no anterior cervical lymphadenopathy [No Joint Swelling] : no joint swelling [Grossly Normal Strength/Tone] : grossly normal strength/tone [No Rash] : no rash [Coordination Grossly Intact] : coordination grossly intact [No Focal Deficits] : no focal deficits [Normal Affect] : the affect was normal [Alert and Oriented x3] : oriented to person, place, and time [Normal Insight/Judgement] : insight and judgment were intact [Stool Occult Blood] : stool negative for occult blood

## 2023-05-05 DIAGNOSIS — E87.6 HYPOKALEMIA: ICD-10-CM

## 2023-05-05 DIAGNOSIS — E87.1 HYPO-OSMOLALITY AND HYPONATREMIA: ICD-10-CM

## 2023-05-05 LAB
25(OH)D3 SERPL-MCNC: 34.5 NG/ML
ALBUMIN SERPL ELPH-MCNC: 4.7 G/DL
ALP BLD-CCNC: 107 U/L
ALT SERPL-CCNC: 21 U/L
ANION GAP SERPL CALC-SCNC: 17 MMOL/L
APPEARANCE: CLEAR
AST SERPL-CCNC: 24 U/L
BASOPHILS # BLD AUTO: 0.09 K/UL
BASOPHILS NFR BLD AUTO: 0.9 %
BILIRUB SERPL-MCNC: 0.5 MG/DL
BILIRUBIN URINE: NEGATIVE
BLOOD URINE: NEGATIVE
BUN SERPL-MCNC: 9 MG/DL
CALCIUM SERPL-MCNC: 10.2 MG/DL
CHLORIDE SERPL-SCNC: 90 MMOL/L
CHOLEST SERPL-MCNC: 194 MG/DL
CO2 SERPL-SCNC: 25 MMOL/L
COLOR: YELLOW
CREAT SERPL-MCNC: 0.62 MG/DL
EGFR: 104 ML/MIN/1.73M2
EOSINOPHIL # BLD AUTO: 0.14 K/UL
EOSINOPHIL NFR BLD AUTO: 1.5 %
ESTIMATED AVERAGE GLUCOSE: 114 MG/DL
FOLATE SERPL-MCNC: 5.4 NG/ML
GLUCOSE QUALITATIVE U: NEGATIVE MG/DL
GLUCOSE SERPL-MCNC: 93 MG/DL
HBA1C MFR BLD HPLC: 5.6 %
HCT VFR BLD CALC: 37.1 %
HDLC SERPL-MCNC: 62 MG/DL
HGB BLD-MCNC: 12 G/DL
IMM GRANULOCYTES NFR BLD AUTO: 0.4 %
KETONES URINE: NEGATIVE MG/DL
LDLC SERPL CALC-MCNC: 117 MG/DL
LEUKOCYTE ESTERASE URINE: ABNORMAL
LYMPHOCYTES # BLD AUTO: 1.72 K/UL
LYMPHOCYTES NFR BLD AUTO: 18 %
MAN DIFF?: NORMAL
MCHC RBC-ENTMCNC: 27 PG
MCHC RBC-ENTMCNC: 32.3 GM/DL
MCV RBC AUTO: 83.6 FL
MONOCYTES # BLD AUTO: 0.56 K/UL
MONOCYTES NFR BLD AUTO: 5.9 %
NEUTROPHILS # BLD AUTO: 7 K/UL
NEUTROPHILS NFR BLD AUTO: 73.3 %
NITRITE URINE: NEGATIVE
NONHDLC SERPL-MCNC: 132 MG/DL
PH URINE: 7.5
PLATELET # BLD AUTO: 384 K/UL
POTASSIUM SERPL-SCNC: 3.3 MMOL/L
PROT SERPL-MCNC: 7.2 G/DL
PROTEIN URINE: NEGATIVE MG/DL
RBC # BLD: 4.44 M/UL
RBC # FLD: 14.6 %
SODIUM SERPL-SCNC: 133 MMOL/L
SPECIFIC GRAVITY URINE: 1.01
TRIGL SERPL-MCNC: 77 MG/DL
TSH SERPL-ACNC: 2.77 UIU/ML
UROBILINOGEN URINE: 0.2 MG/DL
VIT B12 SERPL-MCNC: 608 PG/ML
WBC # FLD AUTO: 9.55 K/UL

## 2023-05-12 LAB
ANION GAP SERPL CALC-SCNC: 15 MMOL/L
BUN SERPL-MCNC: 8 MG/DL
CALCIUM SERPL-MCNC: 10.4 MG/DL
CHLORIDE SERPL-SCNC: 93 MMOL/L
CO2 SERPL-SCNC: 28 MMOL/L
CREAT SERPL-MCNC: 0.72 MG/DL
EGFR: 97 ML/MIN/1.73M2
GLUCOSE SERPL-MCNC: 97 MG/DL
POTASSIUM SERPL-SCNC: 4.1 MMOL/L
SODIUM SERPL-SCNC: 136 MMOL/L

## 2023-05-17 ENCOUNTER — RX RENEWAL (OUTPATIENT)
Age: 58
End: 2023-05-17

## 2023-06-15 ENCOUNTER — RX RENEWAL (OUTPATIENT)
Age: 58
End: 2023-06-15

## 2023-07-17 ENCOUNTER — RX RENEWAL (OUTPATIENT)
Age: 58
End: 2023-07-17

## 2023-07-25 ENCOUNTER — NON-APPOINTMENT (OUTPATIENT)
Age: 58
End: 2023-07-25

## 2023-07-26 ENCOUNTER — APPOINTMENT (OUTPATIENT)
Dept: GASTROENTEROLOGY | Facility: CLINIC | Age: 58
End: 2023-07-26
Payer: COMMERCIAL

## 2023-07-26 ENCOUNTER — NON-APPOINTMENT (OUTPATIENT)
Age: 58
End: 2023-07-26

## 2023-07-26 VITALS
BODY MASS INDEX: 38.98 KG/M2 | HEIGHT: 63 IN | WEIGHT: 220 LBS | HEART RATE: 113 BPM | DIASTOLIC BLOOD PRESSURE: 80 MMHG | OXYGEN SATURATION: 99 % | TEMPERATURE: 97.1 F | SYSTOLIC BLOOD PRESSURE: 140 MMHG

## 2023-07-26 DIAGNOSIS — Z00.00 ENCOUNTER FOR GENERAL ADULT MEDICAL EXAMINATION W/OUT ABNORMAL FINDINGS: ICD-10-CM

## 2023-07-26 PROCEDURE — 99203 OFFICE O/P NEW LOW 30 MIN: CPT

## 2023-07-26 RX ORDER — POLYETHYLENE GLYCOL-3350 AND ELECTROLYTES 236; 6.74; 5.86; 2.97; 22.74 G/274.31G; G/274.31G; G/274.31G; G/274.31G; G/274.31G
236 POWDER, FOR SOLUTION ORAL
Qty: 1 | Refills: 0 | Status: ACTIVE | COMMUNITY
Start: 2023-07-26 | End: 1900-01-01

## 2023-07-26 NOTE — HISTORY OF PRESENT ILLNESS
[FreeTextEntry1] : Patient came to the office today to arrange for colonoscopy.  It has been several years since her last examination.  Patient has a history of previous colonic polyps.  Past medical and surgical history are reviewed including asthma and previous hysterectomy for early endometrial cancer.  The patient denies current complaints of nausea vomiting fever chills rectal bleeding or melena.  She does have occasional constipation.

## 2023-07-26 NOTE — ASSESSMENT
[FreeTextEntry1] : Impression and plan\par Patient came to the office today for evaluation prior to colonoscopy.  The risks benefits alternatives and limitations were discussed.  Overall the patient is doing well from a GI perspective she does have minor constipation.  Acid reflux is well controlled by periodic use of PPI.  Patient will call back if any new issues or complaints in the interim.

## 2023-09-11 ENCOUNTER — RX RENEWAL (OUTPATIENT)
Age: 58
End: 2023-09-11

## 2023-09-19 ENCOUNTER — APPOINTMENT (OUTPATIENT)
Dept: GASTROENTEROLOGY | Facility: AMBULATORY MEDICAL SERVICES | Age: 58
End: 2023-09-19
Payer: COMMERCIAL

## 2023-09-19 PROCEDURE — 45380 COLONOSCOPY AND BIOPSY: CPT | Mod: PT

## 2023-12-11 ENCOUNTER — RX RENEWAL (OUTPATIENT)
Age: 58
End: 2023-12-11

## 2024-01-08 ENCOUNTER — RX RENEWAL (OUTPATIENT)
Age: 59
End: 2024-01-08

## 2024-03-07 ENCOUNTER — RX RENEWAL (OUTPATIENT)
Age: 59
End: 2024-03-07

## 2024-03-25 ENCOUNTER — RX RENEWAL (OUTPATIENT)
Age: 59
End: 2024-03-25

## 2024-04-07 ENCOUNTER — RX RENEWAL (OUTPATIENT)
Age: 59
End: 2024-04-07

## 2024-05-07 ENCOUNTER — RX RENEWAL (OUTPATIENT)
Age: 59
End: 2024-05-07

## 2024-05-07 RX ORDER — LEVOTHYROXINE SODIUM 0.05 MG/1
50 TABLET ORAL
Qty: 90 | Refills: 0 | Status: ACTIVE | COMMUNITY
Start: 2020-10-21 | End: 1900-01-01

## 2024-05-28 DIAGNOSIS — H92.09 OTALGIA, UNSPECIFIED EAR: ICD-10-CM

## 2024-06-07 ENCOUNTER — RX RENEWAL (OUTPATIENT)
Age: 59
End: 2024-06-07

## 2024-06-07 RX ORDER — TRIAMTERENE AND HYDROCHLOROTHIAZIDE 25; 37.5 MG/1; MG/1
37.5-25 TABLET ORAL
Qty: 90 | Refills: 0 | Status: ACTIVE | COMMUNITY
Start: 2017-09-19 | End: 1900-01-01

## 2024-06-07 RX ORDER — AMLODIPINE BESYLATE 5 MG/1
5 TABLET ORAL
Qty: 90 | Refills: 0 | Status: ACTIVE | COMMUNITY
Start: 2017-10-04 | End: 1900-01-01

## 2024-06-25 ENCOUNTER — RX RENEWAL (OUTPATIENT)
Age: 59
End: 2024-06-25

## 2024-06-25 RX ORDER — OMEPRAZOLE 40 MG/1
40 CAPSULE, DELAYED RELEASE ORAL
Qty: 90 | Refills: 0 | Status: ACTIVE | COMMUNITY
Start: 2022-06-24 | End: 1900-01-01

## 2024-07-02 ENCOUNTER — TRANSCRIPTION ENCOUNTER (OUTPATIENT)
Age: 59
End: 2024-07-02

## 2024-07-09 DIAGNOSIS — E87.6 HYPOKALEMIA: ICD-10-CM

## 2024-07-09 DIAGNOSIS — C55 MALIGNANT NEOPLASM OF UTERUS, PART UNSPECIFIED: ICD-10-CM

## 2024-07-09 DIAGNOSIS — E87.1 HYPO-OSMOLALITY AND HYPONATREMIA: ICD-10-CM

## 2024-07-12 ENCOUNTER — LABORATORY RESULT (OUTPATIENT)
Age: 59
End: 2024-07-12

## 2024-07-15 LAB
25(OH)D3 SERPL-MCNC: 35.5 NG/ML
ALBUMIN SERPL ELPH-MCNC: 4.6 G/DL
ALP BLD-CCNC: 91 U/L
ALT SERPL-CCNC: 21 U/L
ANION GAP SERPL CALC-SCNC: 15 MMOL/L
AST SERPL-CCNC: 18 U/L
BASOPHILS # BLD AUTO: 0.06 K/UL
BASOPHILS NFR BLD AUTO: 0.7 %
BILIRUB SERPL-MCNC: 0.5 MG/DL
BILIRUBIN URINE: NEGATIVE
BLOOD URINE: NEGATIVE
BUN SERPL-MCNC: 9 MG/DL
CALCIUM SERPL-MCNC: 10 MG/DL
CHOLEST SERPL-MCNC: 168 MG/DL
CO2 SERPL-SCNC: 28 MMOL/L
COLOR: YELLOW
CREAT SERPL-MCNC: 0.68 MG/DL
EGFR: 100 ML/MIN/1.73M2
EOSINOPHIL # BLD AUTO: 0.08 K/UL
EOSINOPHIL NFR BLD AUTO: 0.9 %
ESTIMATED AVERAGE GLUCOSE: 126 MG/DL
GLUCOSE QUALITATIVE U: NEGATIVE MG/DL
GLUCOSE SERPL-MCNC: 101 MG/DL
HCT VFR BLD CALC: 36.7 %
HDLC SERPL-MCNC: 42 MG/DL
HGB BLD-MCNC: 11.5 G/DL
IMM GRANULOCYTES NFR BLD AUTO: 0.3 %
KETONES URINE: NEGATIVE MG/DL
LDLC SERPL CALC-MCNC: 110 MG/DL
LEUKOCYTE ESTERASE URINE: ABNORMAL
LYMPHOCYTES # BLD AUTO: 1.53 K/UL
LYMPHOCYTES NFR BLD AUTO: 16.7 %
MAN DIFF?: NORMAL
MCHC RBC-ENTMCNC: 26.6 PG
MCHC RBC-ENTMCNC: 31.3 GM/DL
MCV RBC AUTO: 84.8 FL
MONOCYTES # BLD AUTO: 0.57 K/UL
MONOCYTES NFR BLD AUTO: 6.2 %
NEUTROPHILS # BLD AUTO: 6.87 K/UL
NITRITE URINE: NEGATIVE
NONHDLC SERPL-MCNC: 126 MG/DL
PH URINE: 7.5
PLATELET # BLD AUTO: 382 K/UL
POTASSIUM SERPL-SCNC: 3.5 MMOL/L
PROTEIN URINE: NEGATIVE MG/DL
RBC # BLD: 4.33 M/UL
RBC # FLD: 16.6 %
SODIUM SERPL-SCNC: 136 MMOL/L
SPECIFIC GRAVITY URINE: 1.01
TRIGL SERPL-MCNC: 89 MG/DL
TSH SERPL-ACNC: 2.11 UIU/ML
UROBILINOGEN URINE: 0.2 MG/DL
WBC # FLD AUTO: 9.14 K/UL

## 2024-07-23 ENCOUNTER — NON-APPOINTMENT (OUTPATIENT)
Age: 59
End: 2024-07-23

## 2024-07-23 ENCOUNTER — APPOINTMENT (OUTPATIENT)
Dept: INTERNAL MEDICINE | Facility: CLINIC | Age: 59
End: 2024-07-23
Payer: COMMERCIAL

## 2024-07-23 VITALS
SYSTOLIC BLOOD PRESSURE: 119 MMHG | WEIGHT: 208 LBS | TEMPERATURE: 97.8 F | OXYGEN SATURATION: 99 % | HEART RATE: 89 BPM | DIASTOLIC BLOOD PRESSURE: 78 MMHG | RESPIRATION RATE: 17 BRPM | HEIGHT: 63 IN | BODY MASS INDEX: 36.86 KG/M2

## 2024-07-23 DIAGNOSIS — I10 ESSENTIAL (PRIMARY) HYPERTENSION: ICD-10-CM

## 2024-07-23 DIAGNOSIS — E78.5 HYPERLIPIDEMIA, UNSPECIFIED: ICD-10-CM

## 2024-07-23 DIAGNOSIS — E66.9 OBESITY, UNSPECIFIED: ICD-10-CM

## 2024-07-23 DIAGNOSIS — E03.9 HYPOTHYROIDISM, UNSPECIFIED: ICD-10-CM

## 2024-07-23 DIAGNOSIS — Z00.00 ENCOUNTER FOR GENERAL ADULT MEDICAL EXAMINATION W/OUT ABNORMAL FINDINGS: ICD-10-CM

## 2024-07-23 DIAGNOSIS — Z86.69 PERSONAL HISTORY OF OTHER DISEASES OF THE NERVOUS SYSTEM AND SENSE ORGANS: ICD-10-CM

## 2024-07-23 DIAGNOSIS — D12.6 BENIGN NEOPLASM OF COLON, UNSPECIFIED: ICD-10-CM

## 2024-07-23 DIAGNOSIS — R73.03 PREDIABETES.: ICD-10-CM

## 2024-07-23 DIAGNOSIS — J45.909 UNSPECIFIED ASTHMA, UNCOMPLICATED: ICD-10-CM

## 2024-07-23 PROCEDURE — 93000 ELECTROCARDIOGRAM COMPLETE: CPT | Mod: 59

## 2024-07-23 PROCEDURE — 99396 PREV VISIT EST AGE 40-64: CPT

## 2024-07-23 NOTE — HEALTH RISK ASSESSMENT
[Good] : ~his/her~  mood as  good [No] : No [No falls in past year] : Patient reported no falls in the past year [0] : 2) Feeling down, depressed, or hopeless: Not at all (0) [Never] : Never [Patient reported PAP Smear was normal] : Patient reported PAP Smear was normal [Patient reported bone density results were normal] : Patient reported bone density results were normal [With Family] : lives with family [On disability] : on disability [Graduate School] : graduate school [Single] : single [Feels Safe at Home] : Feels safe at home [Fully functional (bathing, dressing, toileting, transferring, walking, feeding)] : Fully functional (bathing, dressing, toileting, transferring, walking, feeding) [Fully functional (using the telephone, shopping, preparing meals, housekeeping, doing laundry, using] : Fully functional and needs no help or supervision to perform IADLs (using the telephone, shopping, preparing meals, housekeeping, doing laundry, using transportation, managing medications and managing finances) [Smoke Detector] : smoke detector [Carbon Monoxide Detector] : carbon monoxide detector [Seat Belt] :  uses seat belt [Sunscreen] : uses sunscreen [Designated Healthcare Proxy] : Designated healthcare proxy [Name: ___] : Health Care Proxy's Name: [unfilled]  [Relationship: ___] : Relationship: [unfilled] [de-identified] : exercises a bit at home [[id number]] : 0 [Change in mental status noted] : No change in mental status noted [Language] : denies difficulty with language [Behavior] : denies difficulty with behavior [Handling Complex Tasks] : denies difficulty handling complex tasks [Reasoning] : denies difficulty with reasoning [Reports changes in hearing] : Reports no changes in hearing [Reports changes in vision] : Reports no changes in vision [Reports changes in dental health] : Reports no changes in dental health [MammogramDate] : 7/2023 [MammogramComments] : Dr. Wheeler [PapSmearDate] : 3/2024 [BoneDensityDate] : 10/2023 [BoneDensityComments] : Dr. Wheeler [ColonoscopyDate] : 9/19/2023 [ColonoscopyComments] : tubular adenoma- repeat 2026

## 2024-07-23 NOTE — HEALTH RISK ASSESSMENT
[Good] : ~his/her~  mood as  good [No] : No [No falls in past year] : Patient reported no falls in the past year [0] : 2) Feeling down, depressed, or hopeless: Not at all (0) [Never] : Never [Patient reported PAP Smear was normal] : Patient reported PAP Smear was normal [Patient reported bone density results were normal] : Patient reported bone density results were normal [With Family] : lives with family [On disability] : on disability [Graduate School] : graduate school [Single] : single [Feels Safe at Home] : Feels safe at home [Fully functional (bathing, dressing, toileting, transferring, walking, feeding)] : Fully functional (bathing, dressing, toileting, transferring, walking, feeding) [Fully functional (using the telephone, shopping, preparing meals, housekeeping, doing laundry, using] : Fully functional and needs no help or supervision to perform IADLs (using the telephone, shopping, preparing meals, housekeeping, doing laundry, using transportation, managing medications and managing finances) [Smoke Detector] : smoke detector [Carbon Monoxide Detector] : carbon monoxide detector [Seat Belt] :  uses seat belt [Sunscreen] : uses sunscreen [Designated Healthcare Proxy] : Designated healthcare proxy [Name: ___] : Health Care Proxy's Name: [unfilled]  [Relationship: ___] : Relationship: [unfilled] [de-identified] : exercises a bit at home [[id number]] : 0 [Change in mental status noted] : No change in mental status noted [Language] : denies difficulty with language [Behavior] : denies difficulty with behavior [Handling Complex Tasks] : denies difficulty handling complex tasks [Reasoning] : denies difficulty with reasoning [Reports changes in hearing] : Reports no changes in hearing [Reports changes in vision] : Reports no changes in vision [Reports changes in dental health] : Reports no changes in dental health [MammogramDate] : 7/2023 [MammogramComments] : Dr. Wheeler [PapSmearDate] : 3/2024 [BoneDensityDate] : 10/2023 [BoneDensityComments] : Dr. Wheeler [ColonoscopyDate] : 9/19/2023 [ColonoscopyComments] : tubular adenoma- repeat 2026

## 2024-07-23 NOTE — HISTORY OF PRESENT ILLNESS
[FreeTextEntry1] : The patient is a 59 year  old female who presents for annual physical examination. [de-identified] : Patient is a 58 yo female with past medical history significant for asthma, hypertension, hyperlipidemia, hypothyroidism, obesity, prediabetes, uterine cancer status post total hysterectomy and bilateral oophorectomy, GERD, IBS, presents for annual wellness exam. Patient reports that she was treated at urgent care in May for upper respiratory infection and ear infection.  Shortly thereafter she developed Bell's palsy and was treated with Valtrex as well as high-dose steroids.  Her symptoms have nearly completely resolved.   She is scheduled to follow-up with ENT specialist, Dr. Colvin next month.   Patient was seen by her pulmonologist -Dr. Roldan earlier today.  Asthma has been stable.  Medications remain unchanged. Patient overdue for mammography (July, 2023) Up-to-date with GYN exam (3/2024) and bone density (10/2023) Up-to-date with colonoscopy 9/19/2023).  Biopsy revealed tubular adenoma.  Advised repeat in 2026

## 2024-07-23 NOTE — PHYSICAL EXAM
[No Acute Distress] : no acute distress [Well Nourished] : well nourished [Well Developed] : well developed [Well-Appearing] : well-appearing [Normal Sclera/Conjunctiva] : normal sclera/conjunctiva [PERRL] : pupils equal round and reactive to light [EOMI] : extraocular movements intact [Normal Outer Ear/Nose] : the outer ears and nose were normal in appearance [Normal Oropharynx] : the oropharynx was normal [No JVD] : no jugular venous distention [No Lymphadenopathy] : no lymphadenopathy [Supple] : supple [Thyroid Normal, No Nodules] : the thyroid was normal and there were no nodules present [No Respiratory Distress] : no respiratory distress  [No Accessory Muscle Use] : no accessory muscle use [Clear to Auscultation] : lungs were clear to auscultation bilaterally [Normal Rate] : normal rate  [Regular Rhythm] : with a regular rhythm [Normal S1, S2] : normal S1 and S2 [No Murmur] : no murmur heard [No Carotid Bruits] : no carotid bruits [No Abdominal Bruit] : a ~M bruit was not heard ~T in the abdomen [No Varicosities] : no varicosities [Pedal Pulses Present] : the pedal pulses are present [No Edema] : there was no peripheral edema [No Palpable Aorta] : no palpable aorta [No Extremity Clubbing/Cyanosis] : no extremity clubbing/cyanosis [Normal Appearance] : normal in appearance [No Nipple Discharge] : no nipple discharge [No Axillary Lymphadenopathy] : no axillary lymphadenopathy [Soft] : abdomen soft [Non Tender] : non-tender [Non-distended] : non-distended [No Masses] : no abdominal mass palpated [No HSM] : no HSM [Normal Bowel Sounds] : normal bowel sounds [Normal Posterior Cervical Nodes] : no posterior cervical lymphadenopathy [Normal Anterior Cervical Nodes] : no anterior cervical lymphadenopathy [No CVA Tenderness] : no CVA  tenderness [No Spinal Tenderness] : no spinal tenderness [No Joint Swelling] : no joint swelling [Grossly Normal Strength/Tone] : grossly normal strength/tone [No Rash] : no rash [Coordination Grossly Intact] : coordination grossly intact [No Focal Deficits] : no focal deficits [Normal Affect] : the affect was normal [Normal Insight/Judgement] : insight and judgment were intact [Alert and Oriented x3] : oriented to person, place, and time [de-identified] : obese

## 2024-07-23 NOTE — HISTORY OF PRESENT ILLNESS
[FreeTextEntry1] : The patient is a 59 year  old female who presents for annual physical examination. [de-identified] : Patient is a 60 yo female with past medical history significant for asthma, hypertension, hyperlipidemia, hypothyroidism, obesity, prediabetes, uterine cancer status post total hysterectomy and bilateral oophorectomy, GERD, IBS, presents for annual wellness exam. Patient reports that she was treated at urgent care in May for upper respiratory infection and ear infection.  Shortly thereafter she developed Bell's palsy and was treated with Valtrex as well as high-dose steroids.  Her symptoms have nearly completely resolved.   She is scheduled to follow-up with ENT specialist, Dr. Colvin next month.   Patient was seen by her pulmonologist -Dr. Roldan earlier today.  Asthma has been stable.  Medications remain unchanged. Patient overdue for mammography (July, 2023) Up-to-date with GYN exam (3/2024) and bone density (10/2023) Up-to-date with colonoscopy 9/19/2023).  Biopsy revealed tubular adenoma.  Advised repeat in 2026

## 2024-07-23 NOTE — PHYSICAL EXAM
[No Acute Distress] : no acute distress [Well Nourished] : well nourished [Well Developed] : well developed [Well-Appearing] : well-appearing [Normal Sclera/Conjunctiva] : normal sclera/conjunctiva [PERRL] : pupils equal round and reactive to light [EOMI] : extraocular movements intact [Normal Outer Ear/Nose] : the outer ears and nose were normal in appearance [Normal Oropharynx] : the oropharynx was normal [No JVD] : no jugular venous distention [No Lymphadenopathy] : no lymphadenopathy [Supple] : supple [Thyroid Normal, No Nodules] : the thyroid was normal and there were no nodules present [No Respiratory Distress] : no respiratory distress  [No Accessory Muscle Use] : no accessory muscle use [Clear to Auscultation] : lungs were clear to auscultation bilaterally [Normal Rate] : normal rate  [Regular Rhythm] : with a regular rhythm [Normal S1, S2] : normal S1 and S2 [No Murmur] : no murmur heard [No Carotid Bruits] : no carotid bruits [No Abdominal Bruit] : a ~M bruit was not heard ~T in the abdomen [No Varicosities] : no varicosities [Pedal Pulses Present] : the pedal pulses are present [No Edema] : there was no peripheral edema [No Palpable Aorta] : no palpable aorta [No Extremity Clubbing/Cyanosis] : no extremity clubbing/cyanosis [Normal Appearance] : normal in appearance [No Nipple Discharge] : no nipple discharge [No Axillary Lymphadenopathy] : no axillary lymphadenopathy [Soft] : abdomen soft [Non Tender] : non-tender [Non-distended] : non-distended [No Masses] : no abdominal mass palpated [No HSM] : no HSM [Normal Bowel Sounds] : normal bowel sounds [Normal Posterior Cervical Nodes] : no posterior cervical lymphadenopathy [Normal Anterior Cervical Nodes] : no anterior cervical lymphadenopathy [No CVA Tenderness] : no CVA  tenderness [No Spinal Tenderness] : no spinal tenderness [No Joint Swelling] : no joint swelling [Grossly Normal Strength/Tone] : grossly normal strength/tone [No Rash] : no rash [Coordination Grossly Intact] : coordination grossly intact [No Focal Deficits] : no focal deficits [Normal Affect] : the affect was normal [Normal Insight/Judgement] : insight and judgment were intact [Alert and Oriented x3] : oriented to person, place, and time [de-identified] : obese

## 2024-09-09 ENCOUNTER — RX RENEWAL (OUTPATIENT)
Age: 59
End: 2024-09-09

## 2024-09-16 ENCOUNTER — RX RENEWAL (OUTPATIENT)
Age: 59
End: 2024-09-16

## 2024-09-30 ENCOUNTER — RX RENEWAL (OUTPATIENT)
Age: 59
End: 2024-09-30

## 2025-06-30 ENCOUNTER — RX RENEWAL (OUTPATIENT)
Age: 60
End: 2025-06-30

## 2025-07-29 ENCOUNTER — LABORATORY RESULT (OUTPATIENT)
Age: 60
End: 2025-07-29

## 2025-07-29 ENCOUNTER — APPOINTMENT (OUTPATIENT)
Dept: INTERNAL MEDICINE | Facility: CLINIC | Age: 60
End: 2025-07-29
Payer: COMMERCIAL

## 2025-07-29 VITALS
TEMPERATURE: 98.2 F | HEART RATE: 98 BPM | HEIGHT: 63 IN | OXYGEN SATURATION: 100 % | WEIGHT: 190 LBS | BODY MASS INDEX: 33.66 KG/M2 | SYSTOLIC BLOOD PRESSURE: 101 MMHG | DIASTOLIC BLOOD PRESSURE: 65 MMHG | RESPIRATION RATE: 17 BRPM

## 2025-07-29 DIAGNOSIS — J45.909 UNSPECIFIED ASTHMA, UNCOMPLICATED: ICD-10-CM

## 2025-07-29 DIAGNOSIS — E03.9 HYPOTHYROIDISM, UNSPECIFIED: ICD-10-CM

## 2025-07-29 DIAGNOSIS — E66.9 OBESITY, UNSPECIFIED: ICD-10-CM

## 2025-07-29 DIAGNOSIS — Z00.00 ENCOUNTER FOR GENERAL ADULT MEDICAL EXAMINATION W/OUT ABNORMAL FINDINGS: ICD-10-CM

## 2025-07-29 DIAGNOSIS — I10 ESSENTIAL (PRIMARY) HYPERTENSION: ICD-10-CM

## 2025-07-29 DIAGNOSIS — K21.9 GASTRO-ESOPHAGEAL REFLUX DISEASE W/OUT ESOPHAGITIS: ICD-10-CM

## 2025-07-29 DIAGNOSIS — D50.9 IRON DEFICIENCY ANEMIA, UNSPECIFIED: ICD-10-CM

## 2025-07-29 DIAGNOSIS — L30.4 ERYTHEMA INTERTRIGO: ICD-10-CM

## 2025-07-29 DIAGNOSIS — C55 MALIGNANT NEOPLASM OF UTERUS, PART UNSPECIFIED: ICD-10-CM

## 2025-07-29 DIAGNOSIS — Z23 ENCOUNTER FOR IMMUNIZATION: ICD-10-CM

## 2025-07-29 DIAGNOSIS — R73.03 PREDIABETES.: ICD-10-CM

## 2025-07-29 DIAGNOSIS — E78.5 HYPERLIPIDEMIA, UNSPECIFIED: ICD-10-CM

## 2025-07-29 PROCEDURE — 99213 OFFICE O/P EST LOW 20 MIN: CPT | Mod: 25

## 2025-07-29 PROCEDURE — 99396 PREV VISIT EST AGE 40-64: CPT

## 2025-07-29 PROCEDURE — 93000 ELECTROCARDIOGRAM COMPLETE: CPT

## 2025-07-29 RX ORDER — BENZONATATE 100 MG/1
100 CAPSULE ORAL
Refills: 0 | Status: ACTIVE | COMMUNITY
Start: 2025-07-29

## 2025-07-29 RX ORDER — CLOTRIMAZOLE AND BETAMETHASONE DIPROPIONATE 10; .5 MG/G; MG/G
1-0.05 CREAM TOPICAL TWICE DAILY
Qty: 1 | Refills: 1 | Status: ACTIVE | COMMUNITY
Start: 2025-07-29 | End: 1900-01-01

## 2025-07-30 LAB
25(OH)D3 SERPL-MCNC: 44.5 NG/ML
ALBUMIN SERPL ELPH-MCNC: 4.9 G/DL
ALP BLD-CCNC: 101 U/L
ALT SERPL-CCNC: 23 U/L
ANION GAP SERPL CALC-SCNC: 18 MMOL/L
APPEARANCE: CLEAR
AST SERPL-CCNC: 27 U/L
BASOPHILS # BLD AUTO: 0.07 K/UL
BASOPHILS NFR BLD AUTO: 0.9 %
BILIRUB SERPL-MCNC: 0.5 MG/DL
BILIRUBIN URINE: NEGATIVE
BLOOD URINE: NEGATIVE
BUN SERPL-MCNC: 12 MG/DL
CALCIUM SERPL-MCNC: 10.3 MG/DL
CHLORIDE SERPL-SCNC: 93 MMOL/L
CHOLEST SERPL-MCNC: 189 MG/DL
CO2 SERPL-SCNC: 24 MMOL/L
COLOR: YELLOW
CREAT SERPL-MCNC: 0.61 MG/DL
EGFRCR SERPLBLD CKD-EPI 2021: 102 ML/MIN/1.73M2
EOSINOPHIL # BLD AUTO: 0.11 K/UL
EOSINOPHIL NFR BLD AUTO: 1.4 %
ESTIMATED AVERAGE GLUCOSE: 117 MG/DL
FERRITIN SERPL-MCNC: 15 NG/ML
GLUCOSE QUALITATIVE U: NEGATIVE MG/DL
GLUCOSE SERPL-MCNC: 89 MG/DL
HBA1C MFR BLD HPLC: 5.7 %
HCT VFR BLD CALC: 38 %
HDLC SERPL-MCNC: 58 MG/DL
HGB BLD-MCNC: 12.2 G/DL
IMM GRANULOCYTES NFR BLD AUTO: 0.4 %
IRON SATN MFR SERPL: 10 %
IRON SERPL-MCNC: 48 UG/DL
KETONES URINE: NEGATIVE MG/DL
LDLC SERPL-MCNC: 118 MG/DL
LEUKOCYTE ESTERASE URINE: ABNORMAL
LYMPHOCYTES # BLD AUTO: 1.65 K/UL
LYMPHOCYTES NFR BLD AUTO: 21 %
MAN DIFF?: NORMAL
MCHC RBC-ENTMCNC: 27.2 PG
MCHC RBC-ENTMCNC: 32.1 G/DL
MCV RBC AUTO: 84.8 FL
MONOCYTES # BLD AUTO: 0.55 K/UL
MONOCYTES NFR BLD AUTO: 7 %
NEUTROPHILS # BLD AUTO: 5.43 K/UL
NEUTROPHILS NFR BLD AUTO: 69.3 %
NITRITE URINE: NEGATIVE
NONHDLC SERPL-MCNC: 130 MG/DL
PH URINE: 8
PLATELET # BLD AUTO: 401 K/UL
POTASSIUM SERPL-SCNC: 3.7 MMOL/L
PROT SERPL-MCNC: 7.2 G/DL
PROTEIN URINE: NEGATIVE MG/DL
RBC # BLD: 4.48 M/UL
RBC # FLD: 15.1 %
SODIUM SERPL-SCNC: 134 MMOL/L
SPECIFIC GRAVITY URINE: 1.01
TIBC SERPL-MCNC: 492 UG/DL
TRIGL SERPL-MCNC: 65 MG/DL
TSH SERPL-ACNC: 1.95 UIU/ML
UIBC SERPL-MCNC: 444 UG/DL
UROBILINOGEN URINE: 0.2 MG/DL
WBC # FLD AUTO: 7.84 K/UL

## 2025-08-05 LAB — HEMOCCULT STL QL IA: NEGATIVE

## 2025-08-21 ENCOUNTER — APPOINTMENT (OUTPATIENT)
Dept: GASTROENTEROLOGY | Facility: CLINIC | Age: 60
End: 2025-08-21
Payer: COMMERCIAL

## 2025-08-21 ENCOUNTER — LABORATORY RESULT (OUTPATIENT)
Age: 60
End: 2025-08-21

## 2025-08-21 VITALS
DIASTOLIC BLOOD PRESSURE: 74 MMHG | OXYGEN SATURATION: 98 % | SYSTOLIC BLOOD PRESSURE: 110 MMHG | HEIGHT: 63 IN | TEMPERATURE: 97.1 F | WEIGHT: 193 LBS | HEART RATE: 90 BPM | BODY MASS INDEX: 34.2 KG/M2

## 2025-08-21 DIAGNOSIS — D50.9 IRON DEFICIENCY ANEMIA, UNSPECIFIED: ICD-10-CM

## 2025-08-21 PROCEDURE — 99214 OFFICE O/P EST MOD 30 MIN: CPT

## 2025-08-25 LAB — CELIAC PANEL: NORMAL

## 2025-09-11 ENCOUNTER — RX RENEWAL (OUTPATIENT)
Age: 60
End: 2025-09-11

## 2025-09-12 ENCOUNTER — RX RENEWAL (OUTPATIENT)
Age: 60
End: 2025-09-12

## 2025-09-18 ENCOUNTER — NON-APPOINTMENT (OUTPATIENT)
Age: 60
End: 2025-09-18